# Patient Record
Sex: MALE | URBAN - METROPOLITAN AREA
[De-identification: names, ages, dates, MRNs, and addresses within clinical notes are randomized per-mention and may not be internally consistent; named-entity substitution may affect disease eponyms.]

---

## 2018-09-27 ENCOUNTER — APPOINTMENT (RX ONLY)
Dept: URBAN - METROPOLITAN AREA CLINIC 87 | Facility: CLINIC | Age: 43
Setting detail: DERMATOLOGY
End: 2018-09-27

## 2018-09-27 DIAGNOSIS — L65.9 NONSCARRING HAIR LOSS, UNSPECIFIED: ICD-10-CM

## 2018-09-27 PROCEDURE — ? TREATMENT REGIMEN

## 2018-09-27 PROCEDURE — 99202 OFFICE O/P NEW SF 15 MIN: CPT

## 2018-09-27 PROCEDURE — ? COUNSELING

## 2018-09-27 PROCEDURE — ? PATIENT SPECIFIC COUNSELING

## 2018-09-27 ASSESSMENT — LOCATION ZONE DERM: LOCATION ZONE: SCALP

## 2018-09-27 ASSESSMENT — LOCATION DETAILED DESCRIPTION DERM: LOCATION DETAILED: LEFT SUPERIOR PARIETAL SCALP

## 2018-09-27 ASSESSMENT — LOCATION SIMPLE DESCRIPTION DERM: LOCATION SIMPLE: SCALP

## 2018-09-27 NOTE — PROCEDURE: TREATMENT REGIMEN
Otc Regimen: Recommend pt try Otc Rogaine for men. He can also look into viviscal supplements and low energy laser hair treatments
Detail Level: Zone

## 2018-09-27 NOTE — PROCEDURE: PATIENT SPECIFIC COUNSELING
I discussed with the pt treatment options including biopsy to r/o inflammatory condition vs androgenetic alopecia. Pt declined today. I explained treatment options including finasteride/dutasteride, OTC minoxidil, viviscal, laser hair treatments. Pt will be receiving testosterone supplements which may help or hinder the hair growth. He is also planning on starting a family. I suggested that we hold on the oral treatments
Detail Level: Simple

## 2018-09-27 NOTE — HPI: HAIR LOSS
How Did The Hair Loss Occur?: gradual in onset
How Severe Is Your Hair Loss?: moderate
Additional History: Pt reports that he has had a diaphragm repair in Dec 2017. He was dx’d with thyroid dz in March/April 2018 adn is already on thyroid medication. He had his thyroid checked last week and his levels are wnl. He reports that he had a general check up last week and was told that he is wnl except for low testosterone. He was given his first shot of testosterone yesterday. Pt denies any family history of Male pattern hair loss. Pt denies any pruritus or scale at the scalp.